# Patient Record
Sex: MALE | Race: BLACK OR AFRICAN AMERICAN | Employment: UNEMPLOYED | URBAN - METROPOLITAN AREA
[De-identification: names, ages, dates, MRNs, and addresses within clinical notes are randomized per-mention and may not be internally consistent; named-entity substitution may affect disease eponyms.]

---

## 2022-12-02 ENCOUNTER — HOSPITAL ENCOUNTER (EMERGENCY)
Age: 37
Discharge: HOME | End: 2022-12-02
Attending: EMERGENCY MEDICINE

## 2022-12-02 VITALS
WEIGHT: 186 LBS | HEART RATE: 76 BPM | BODY MASS INDEX: 26.04 KG/M2 | HEIGHT: 71 IN | SYSTOLIC BLOOD PRESSURE: 138 MMHG | OXYGEN SATURATION: 100 % | DIASTOLIC BLOOD PRESSURE: 80 MMHG | TEMPERATURE: 98.1 F | RESPIRATION RATE: 16 BRPM

## 2022-12-02 DIAGNOSIS — J01.00 ACUTE NON-RECURRENT MAXILLARY SINUSITIS: Primary | ICD-10-CM

## 2022-12-02 LAB
FLUAV AG NPH QL IA: NEGATIVE
FLUBV AG NPH QL IA: NEGATIVE
SARS-COV-2 RDRP RESP QL NAA+PROBE: NOT DETECTED
SOURCE: NORMAL
SPECIMEN SOURCE: NORMAL

## 2022-12-02 PROCEDURE — 87804 INFLUENZA ASSAY W/OPTIC: CPT

## 2022-12-02 PROCEDURE — 99283 EMERGENCY DEPT VISIT LOW MDM: CPT

## 2022-12-02 PROCEDURE — 87635 SARS-COV-2 COVID-19 AMP PRB: CPT

## 2022-12-02 RX ORDER — AMOXICILLIN AND CLAVULANATE POTASSIUM 875; 125 MG/1; MG/1
1 TABLET, FILM COATED ORAL 2 TIMES DAILY
Qty: 10 TABLET | Refills: 0 | Status: SHIPPED | OUTPATIENT
Start: 2022-12-02 | End: 2022-12-07

## 2022-12-02 ASSESSMENT — ENCOUNTER SYMPTOMS
SHORTNESS OF BREATH: 0
WHEEZING: 0
SINUS CONGESTION: 1
SINUS PRESSURE: 1
SORE THROAT: 0
RHINORRHEA: 1
COUGH: 1

## 2022-12-02 ASSESSMENT — PAIN - FUNCTIONAL ASSESSMENT: PAIN_FUNCTIONAL_ASSESSMENT: 0-10

## 2022-12-02 ASSESSMENT — PAIN DESCRIPTION - DESCRIPTORS: DESCRIPTORS: ACHING

## 2022-12-02 ASSESSMENT — PAIN DESCRIPTION - LOCATION: LOCATION: HEAD

## 2022-12-02 ASSESSMENT — PAIN SCALES - GENERAL: PAINLEVEL_OUTOF10: 7

## 2022-12-02 NOTE — DISCHARGE INSTRUCTIONS
Take medication as prescribed. Return for any new, worsening, or concerning symptoms but otherwise follow-up with your primary care provider.

## 2022-12-02 NOTE — ED PROVIDER NOTES
Emergency Department Provider Note                   PCP:                No primary care provider on file. Age: 40 y.o. Sex: male       ICD-10-CM    1. Acute non-recurrent maxillary sinusitis  J01.00           DISPOSITION Decision To Discharge 12/02/2022 10:18:17 AM        MDM  Number of Diagnoses or Management Options  Acute non-recurrent maxillary sinusitis: new, needed workup  Diagnosis management comments: Overall well-appearing 58-year-old male presents the emergency department today with complaint of cold symptoms. Patient appears in no acute distress today. Lung sounds are clear. He is afebrile. SPO2 is 100% on room air. Will check for COVID and flu. COVID and flu are negative. Given his symptoms have been ongoing for about 8 days, will cover for acute sinusitis with the congestion and sinus pressure he reports today. Red flag symptoms and return precautions discussed. Otherwise supportive treatment discussed and encouraged. Work note provided as requested. Patient verbalizes understanding agreement with instructions, treatment plan, discharge.        Amount and/or Complexity of Data Reviewed  Clinical lab tests: reviewed    Risk of Complications, Morbidity, and/or Mortality  Presenting problems: low  Diagnostic procedures: low  Management options: low    Patient Progress  Patient progress: improved             Orders Placed This Encounter   Procedures    Rapid influenza A/B antigens    COVID-19, Rapid          Medications - No data to display    Discharge Medication List as of 12/2/2022 10:18 AM        START taking these medications    Details   amoxicillin-clavulanate (AUGMENTIN) 875-125 MG per tablet Take 1 tablet by mouth 2 times daily for 5 days, Disp-10 tablet, R-0Normal              Tracey Phipps is a 40 y.o. male who presents to the Emergency Department with chief complaint of    Chief Complaint   Patient presents with    Headache      58-year-old male who presents to the ER for complaints of cough, congestion, runny nose, body aches, and headaches since Thanksgiving. He has been taking Nyquil and Mucinex without relief. He denies known fever. He denies any past medical or surgical history. He denies any chest pain, abdominal pain, nausea, vomiting, diarrhea, or shortness of breath. The history is provided by the patient. Cough  Cough characteristics:  Productive  Sputum characteristics:  Green  Severity:  Moderate  Onset quality:  Gradual  Timing:  Intermittent  Progression:  Unchanged  Chronicity:  New  Relieved by:  Nothing  Worsened by:  Nothing  Ineffective treatments:  Cough suppressants  Associated symptoms: chills, headaches, myalgias, rhinorrhea and sinus congestion    Associated symptoms: no chest pain, no fever, no shortness of breath, no sore throat and no wheezing        Review of Systems   Constitutional:  Positive for chills. Negative for fever. HENT:  Positive for congestion, rhinorrhea and sinus pressure. Negative for sore throat. Respiratory:  Positive for cough. Negative for shortness of breath and wheezing. Cardiovascular:  Negative for chest pain. Musculoskeletal:  Positive for myalgias. Neurological:  Positive for headaches. All other systems reviewed and are negative. No past medical history on file. No past surgical history on file. No family history on file. Social History     Socioeconomic History    Marital status: Single         Patient has no known allergies. Discharge Medication List as of 12/2/2022 10:18 AM           Vitals signs and nursing note reviewed. No data found. Physical Exam  Vitals and nursing note reviewed. Constitutional:       General: He is not in acute distress. Appearance: Normal appearance. He is not ill-appearing, toxic-appearing or diaphoretic. HENT:      Head: Normocephalic and atraumatic.       Right Ear: External ear normal.      Left Ear: External ear normal. Nose: Congestion present. No rhinorrhea. Eyes:      Extraocular Movements: Extraocular movements intact. Conjunctiva/sclera: Conjunctivae normal.   Cardiovascular:      Rate and Rhythm: Normal rate. Heart sounds: Normal heart sounds. Pulmonary:      Effort: Pulmonary effort is normal. No respiratory distress. Breath sounds: Normal breath sounds. Abdominal:      General: Abdomen is flat. There is no distension. Musculoskeletal:         General: Normal range of motion. Cervical back: Normal range of motion. Skin:     General: Skin is warm and dry. Neurological:      General: No focal deficit present. Mental Status: He is alert and oriented to person, place, and time. Psychiatric:         Mood and Affect: Mood normal.         Behavior: Behavior normal.         Thought Content: Thought content normal.         Judgment: Judgment normal.        Procedures    Results for orders placed or performed during the hospital encounter of 12/02/22   Rapid influenza A/B antigens    Specimen: Nasal Washing   Result Value Ref Range    Influenza A Ag Negative NEG      Influenza B Ag Negative NEG      Source Nasopharyngeal     COVID-19, Rapid    Specimen: Nasopharyngeal   Result Value Ref Range    Source Nasopharyngeal      SARS-CoV-2, Rapid Not detected NOTD          No orders to display                       Voice dictation software was used during the making of this note. This software is not perfect and grammatical and other typographical errors may be present. This note has not been completely proofread for errors.        KALYAN Maxwell - [facility]  12/02/22 1046

## 2022-12-02 NOTE — ED NOTES
Discharge instructions including 1 prescription reviewed with pt. Pt verbalized understanding. Pt ambulatory to exit in stable condition.      Jody Hogan RN  12/02/22 [id number]

## 2022-12-02 NOTE — ED TRIAGE NOTES
Pt ambulatory to triage for reports of cough, body aches, headache & runny nose since Thanksgiving. Pt endorsing 7/10 headache at this time. Pt states he has been taking nyquil & mucinex without much relief. Pt a&ox4.

## 2022-12-02 NOTE — Clinical Note
Edilma Loza was seen and treated in our emergency department on 12/2/2022. He may return to work on 12/05/2022. If you have any questions or concerns, please don't hesitate to call.       Joshua Hernandez, KALYAN - CNP